# Patient Record
Sex: FEMALE | Race: WHITE | NOT HISPANIC OR LATINO | URBAN - METROPOLITAN AREA
[De-identification: names, ages, dates, MRNs, and addresses within clinical notes are randomized per-mention and may not be internally consistent; named-entity substitution may affect disease eponyms.]

---

## 2020-11-28 ENCOUNTER — HOSPITAL ENCOUNTER (OUTPATIENT)
Facility: MEDICAL CENTER | Age: 25
End: 2020-11-28
Attending: STUDENT IN AN ORGANIZED HEALTH CARE EDUCATION/TRAINING PROGRAM
Payer: COMMERCIAL

## 2020-11-28 ENCOUNTER — OFFICE VISIT (OUTPATIENT)
Dept: URGENT CARE | Facility: CLINIC | Age: 25
End: 2020-11-28
Payer: COMMERCIAL

## 2020-11-28 VITALS
HEIGHT: 68 IN | OXYGEN SATURATION: 98 % | WEIGHT: 233.8 LBS | HEART RATE: 96 BPM | RESPIRATION RATE: 16 BRPM | DIASTOLIC BLOOD PRESSURE: 70 MMHG | TEMPERATURE: 97.9 F | SYSTOLIC BLOOD PRESSURE: 122 MMHG | BODY MASS INDEX: 35.43 KG/M2

## 2020-11-28 DIAGNOSIS — Z20.822 COVID-19 RULED OUT: ICD-10-CM

## 2020-11-28 DIAGNOSIS — R51.9 ACUTE NONINTRACTABLE HEADACHE, UNSPECIFIED HEADACHE TYPE: ICD-10-CM

## 2020-11-28 DIAGNOSIS — J06.9 VIRAL URI WITH COUGH: ICD-10-CM

## 2020-11-28 DIAGNOSIS — Z71.89 COUNSELED ABOUT COVID-19 VIRUS INFECTION: ICD-10-CM

## 2020-11-28 DIAGNOSIS — Z20.828 CONTACT WITH OR EXPOSURE TO VIRAL DISEASE: ICD-10-CM

## 2020-11-28 DIAGNOSIS — Z71.6 TOBACCO ABUSE COUNSELING: ICD-10-CM

## 2020-11-28 PROCEDURE — U0003 INFECTIOUS AGENT DETECTION BY NUCLEIC ACID (DNA OR RNA); SEVERE ACUTE RESPIRATORY SYNDROME CORONAVIRUS 2 (SARS-COV-2) (CORONAVIRUS DISEASE [COVID-19]), AMPLIFIED PROBE TECHNIQUE, MAKING USE OF HIGH THROUGHPUT TECHNOLOGIES AS DESCRIBED BY CMS-2020-01-R: HCPCS

## 2020-11-28 PROCEDURE — 99203 OFFICE O/P NEW LOW 30 MIN: CPT | Mod: CS | Performed by: STUDENT IN AN ORGANIZED HEALTH CARE EDUCATION/TRAINING PROGRAM

## 2020-11-28 SDOH — HEALTH STABILITY: MENTAL HEALTH: HOW OFTEN DO YOU HAVE A DRINK CONTAINING ALCOHOL?: 2-3 TIMES A WEEK

## 2020-11-28 NOTE — PROGRESS NOTES
"Subjective:   CHIEF COMPLAINT  Chief Complaint   Patient presents with   • Coronavirus Screening     1 week, headache       HPI  Mariah Page is a 25 y.o. female who presents with a chief complaint of headache for 1 week.  She also reports associated symptoms of cough, nasal congestion, sore throat and wheezing.  The headache is localized to her forehead.  Reports her symptoms are worse with light, specifically looking at a computer screen and improve when lying down.  Symptoms are worse first thing in the morning.  She has no past medical history of migraines.  Reports she has been around a friend that recently tested positive for Covid.    REVIEW OF SYSTEMS  General: no fever or chills  Skin: no rashes or new lesions  Eyes: no blurry vision or pain  ENT: Admits congestion, runny nose  Cardiovascular: no chest pain or palpitations   Resp: Admits cough and wheezing.  No SOB  GI: Admits nausea.  No vomiting  Neuro: numbness or tingling   Endo: no heat/cold intolerance or excessive thirst  Psych: no anxiety or depression  See HPI for further details.    PAST MEDICAL HISTORY       SURGICAL HISTORY  patient denies any surgical history    ALLERGIES  Not on File    CURRENT MEDICATIONS  Home Medications    **Home medications have not yet been reviewed for this encounter**         SOCIAL HISTORY  Social History     Tobacco Use   • Smoking status: Current Every Day Smoker     Types: Cigarettes   Substance and Sexual Activity   • Alcohol use: Yes     Frequency: 2-3 times a week   • Drug use: Not on file   • Sexual activity: Not on file       FAMILY HISTORY  No family history on file.       Objective:   PHYSICAL EXAM  VITAL SIGNS: /70 (BP Location: Left arm)   Pulse 96   Temp 36.6 °C (97.9 °F) (Temporal)   Resp 16   Ht 1.727 m (5' 8\")   Wt 106.1 kg (233 lb 12.8 oz)   SpO2 98%   BMI 35.55 kg/m²     Gen: no acute distress, normal voice  Skin: dry, intact, moist mucosal membranes  Eyes: no conjunctival infection " b/l  ENT: TMs clear and intact b/l w/o presence of effusion, no cervical lymphadenopathy   Neck: no meningeal signs, full ROM  Lungs: CTAB w/ symmetric expansion  CV: RRR w/o murmurs or clicks  Psych: normal affect, normal judgement, alert, awake    Assessment/Plan:     1. Acute nonintractable headache, unspecified headache type     2. Viral URI with cough  COVID/SARS COV-2 PCR   3. Contact with or exposure to viral disease  COVID/SARS COV-2 PCR   4. COVID-19 ruled out  COVID/SARS COV-2 PCR   5. Counseled about COVID-19 virus infection     6. Tobacco abuse counseling     Signs and symptoms are consistent with a viral upper respiratory tract infection.  She was recently around a friend who tested positive for Covid.  -Ordered Covid.  Results will be sent through [facility].  -Instructed to quarantine until Covid results have been returned  -Encouraged hydration and symptomatic treatment with Tylenol/ibuprofen prn  -Encouraged tobacco cessation.  Also discussed risks of contraceptives with use of tobacco.  -Instructed to perform nasal flushes nightly  -Pt was in full understanding and agreement with the plan.      Differential diagnosis, natural history, supportive care, and indications for immediate follow-up discussed. All questions answered. Patient agrees with the plan of care.    Follow-up as needed if symptoms worsen or fail to improve to PCP, Urgent care or Emergency Room.    Please note that this dictation was created using voice recognition software. I have made a reasonable attempt to correct obvious errors, but I expect that there are errors of grammar and possibly content that I did not discover before finalizing the note.

## 2020-11-29 DIAGNOSIS — Z20.828 CONTACT WITH OR EXPOSURE TO VIRAL DISEASE: ICD-10-CM

## 2020-11-29 DIAGNOSIS — Z20.822 COVID-19 RULED OUT: ICD-10-CM

## 2020-11-29 DIAGNOSIS — J06.9 VIRAL URI WITH COUGH: ICD-10-CM

## 2020-11-29 LAB
COVID ORDER STATUS COVID19: NORMAL
SARS-COV-2 RNA RESP QL NAA+PROBE: NOTDETECTED
SPECIMEN SOURCE: NORMAL

## 2020-12-02 ENCOUNTER — TELEPHONE (OUTPATIENT)
Dept: URGENT CARE | Facility: CLINIC | Age: 25
End: 2020-12-02

## 2020-12-03 NOTE — TELEPHONE ENCOUNTER
----- Message from Hira Dixon D.O. sent at 11/30/2020  6:25 PM PST -----  Please notify patient of negative test for Co-19; no change in treatment plan.

## 2020-12-03 NOTE — TELEPHONE ENCOUNTER
Left Voicemail message notifying patient of negative Covid Results and that there was no change to her Tx plan.